# Patient Record
Sex: MALE | Race: WHITE | NOT HISPANIC OR LATINO
[De-identification: names, ages, dates, MRNs, and addresses within clinical notes are randomized per-mention and may not be internally consistent; named-entity substitution may affect disease eponyms.]

---

## 2018-12-21 ENCOUNTER — APPOINTMENT (OUTPATIENT)
Dept: DERMATOLOGY | Facility: CLINIC | Age: 58
End: 2018-12-21
Payer: MEDICARE

## 2018-12-21 PROBLEM — Z00.00 ENCOUNTER FOR PREVENTIVE HEALTH EXAMINATION: Status: ACTIVE | Noted: 2018-12-21

## 2018-12-21 PROCEDURE — 99203 OFFICE O/P NEW LOW 30 MIN: CPT

## 2022-03-19 PROBLEM — F41.9 ANXIETY: Status: ACTIVE | Noted: 2020-09-16

## 2022-03-19 PROBLEM — R49.8 HYPOPHONIA: Status: ACTIVE | Noted: 2020-09-16

## 2022-03-19 PROBLEM — G20 PARKINSON DISEASE (HCC): Status: ACTIVE | Noted: 2020-09-16

## 2022-03-19 PROBLEM — R25.1 TREMOR: Status: ACTIVE | Noted: 2020-09-16

## 2022-03-19 PROBLEM — G20.A1 PARKINSON DISEASE: Status: ACTIVE | Noted: 2020-09-16

## 2022-08-17 ENCOUNTER — OFFICE VISIT (OUTPATIENT)
Dept: NEUROLOGY | Age: 62
End: 2022-08-17
Payer: MEDICARE

## 2022-08-17 VITALS
HEART RATE: 80 BPM | SYSTOLIC BLOOD PRESSURE: 117 MMHG | WEIGHT: 195 LBS | DIASTOLIC BLOOD PRESSURE: 74 MMHG | HEIGHT: 70 IN | BODY MASS INDEX: 27.92 KG/M2

## 2022-08-17 DIAGNOSIS — F41.9 ANXIETY: ICD-10-CM

## 2022-08-17 DIAGNOSIS — R49.8 HYPOPHONIA: ICD-10-CM

## 2022-08-17 DIAGNOSIS — G20 PARKINSON DISEASE (HCC): Primary | ICD-10-CM

## 2022-08-17 DIAGNOSIS — R25.1 TREMOR: ICD-10-CM

## 2022-08-17 PROCEDURE — 3017F COLORECTAL CA SCREEN DOC REV: CPT | Performed by: PSYCHIATRY & NEUROLOGY

## 2022-08-17 PROCEDURE — G8419 CALC BMI OUT NRM PARAM NOF/U: HCPCS | Performed by: PSYCHIATRY & NEUROLOGY

## 2022-08-17 PROCEDURE — 1036F TOBACCO NON-USER: CPT | Performed by: PSYCHIATRY & NEUROLOGY

## 2022-08-17 PROCEDURE — G8427 DOCREV CUR MEDS BY ELIG CLIN: HCPCS | Performed by: PSYCHIATRY & NEUROLOGY

## 2022-08-17 PROCEDURE — 99215 OFFICE O/P EST HI 40 MIN: CPT | Performed by: PSYCHIATRY & NEUROLOGY

## 2022-08-17 ASSESSMENT — PATIENT HEALTH QUESTIONNAIRE - PHQ9
SUM OF ALL RESPONSES TO PHQ QUESTIONS 1-9: 0
1. LITTLE INTEREST OR PLEASURE IN DOING THINGS: 0
2. FEELING DOWN, DEPRESSED OR HOPELESS: 0
SUM OF ALL RESPONSES TO PHQ QUESTIONS 1-9: 0
SUM OF ALL RESPONSES TO PHQ9 QUESTIONS 1 & 2: 0
SUM OF ALL RESPONSES TO PHQ QUESTIONS 1-9: 0
SUM OF ALL RESPONSES TO PHQ QUESTIONS 1-9: 0

## 2022-08-17 ASSESSMENT — ENCOUNTER SYMPTOMS
ALLERGIC/IMMUNOLOGIC NEGATIVE: 1
EYES NEGATIVE: 1
GASTROINTESTINAL NEGATIVE: 1
RESPIRATORY NEGATIVE: 1

## 2022-08-17 NOTE — PROGRESS NOTES
08/17/22  Tasia Luis        Chief Complaint:  Chief Complaint   Patient presents with    Neurologic Problem     6 month follow up Parkinson's        Parkinson's Disease Diagnosed: 20 years ago at 43years old that began with right hand tremor and dexterity issues. Tremors began about 2 years ago. HPI:   Tasia Luis, 60 y.o.,male here in clinic follow up for continued management of Parkinson's Disease. He and his wife feel he is stable. They work hard at eating very healthy. He is sleeping well. Swallowing is not an issue. No constipation. Balance is stable at this time when his meds work, he feels he moves well. Denies night time wearing off symptoms despite last dose at 4:30pm. Sinemet kicks in at about 30-40 mins. Current Neuro related meds:  Sinemet 25/100mg 1.5 tabs at 6:30am, 2:30 pm and 1 tab at 10:30am, 4:30pm (3-3.5 hrs apart)      Review of Systems:    Review of Systems   Constitutional: Negative. HENT: Negative. Eyes: Negative. Respiratory: Negative. Cardiovascular: Negative. Gastrointestinal: Negative. Endocrine: Negative. Genitourinary: Negative. Musculoskeletal: Negative. Skin: Negative. Allergic/Immunologic: Negative. Neurological: Negative. Hematological: Negative. Psychiatric/Behavioral: Negative. Patient denies:  dizziness or light headedness,  drooling or swallowing issues  constipation,   hallucinations/ visual illusions or impulse control disorder   recent falls. RBD     RLS    Past Medical History:   Diagnosis Date    [facility]         History reviewed. No pertinent surgical history.     Family History   Problem Relation Age of Onset    No Known Problems Mother     No Known Problems Father        Social History     Socioeconomic History    Marital status:      Spouse name: Not on file    Number of children: Not on file    Years of education: Not on file    Highest education level: Not on file   Occupational History    Not on file   Tobacco Use    Smoking status: Never    Smokeless tobacco: Never   Substance and Sexual Activity    Alcohol use: Never    Drug use: Never    Sexual activity: Not on file   Other Topics Concern    Not on file   Social History Narrative    Not on file     Social Determinants of Health     Financial Resource Strain: Not on file   Food Insecurity: Not on file   Transportation Needs: Not on file   Physical Activity: Not on file   Stress: Not on file   Social Connections: Not on file   Intimate Partner Violence: Not on file   Housing Stability: Not on file       Current Outpatient Medications on File Prior to Visit   Medication Sig Dispense Refill    carbidopa-levodopa (SINEMET)  MG per tablet TAKE 1&1/2 TABLETS BY MOUTH AT 6:30AM AND  2:30PM AND TAKE 1 TABLET BY MOUTH AT 10:30 AM AND 6:30PM 450 tablet 0     No current facility-administered medications on file prior to visit. No Known Allergies        Physical Examination    Vitals:    08/17/22 1111 08/17/22 1113   BP: 133/83 117/74   Site: Left Upper Arm Left Upper Arm   Position: Sitting Standing   Pulse: 68 80   Weight: 195 lb (88.5 kg)    Height: 5' 10\" (1.778 m)          General: No acute distress  Psychiatric: well oriented, normal mood and affect  Cardiovascular: no peripheral edema, no JVD, no carotid bruits, RRR  Pulmonary: CTAB  Skin: No rashes, lesions or ulcers  Ext: no C/C/E      Neurologic Exam  Patient oriented to Person, Place and Time. Language and fund of knowledge intact. CN:  Extraocular movements are intact,facial sensation  Intact and strength is intact, , palate elevates normally, tongue protrudes midline, shoulder shrug is normal.    Motor:RUE 5/5, RLE 5/5, LUE 5/5, LLE 5/5 ,  normal bulk and tone    Reflexes: Patellar reflexes are +2 , Achilles reflexes are 1+ , toes are downgoing.     Sensation: Normal  light touch, temperature and vibration throughout     Cerebellar: FTN, HTS intact    Motor Examination: Last dose of sinemet was 2 hrs ago. But unsure of time    Voice tremor     Chin tremor      RIGHT LEFT   Tremor at rest 0 0   Postural Tremor of hands 0 0   Action Tremor of hands 0 0   Tremor of Lower extremity 0 0   Open/Close 2 3   Rapid Alternating Movements of Hands 2 2   Finger Taps 2 3   Rigidity - Upper extremity 2 2   Rigidity- Lower extremity  2 3   Foot tapping/LE agility 3 4       Hypophonia 3, freezing of speech   Hypomimia 3   Arising from Chair WNL   Posture Slighty bent forward at waist   Gait Good speed but decreased stride and freezing with turns, arms pulled back at his sides, dystonic position. Pull test Deferred    Dyskinesia  absent   Body Bradykinsesia 3     Assessment/Plan:   Diagnosis Orders   1. Parkinson disease ([facility]        2. Tremor        3. Anxiety        4. Hypophonia              Today I spent time discussing with him and his wife his concerns for increasing his sinemet dose. It has been something that has been needing to happen but he has always been reluctant and even discussing it causing him an intense amount of anxiety that it obvious. But to leave him on a current dose is not something I can feel good doing so we discussed his concerns and what side effects of the medication are true and not true. Such as sinemet causing dementia or leading to tolerance. These are incorrect notions. He has agreed to a slow titration over several weeks to 2 tabs QID. I suspect some mild CI and will monitor for this. I have spent greater than 50% of the patient's 60 minute visit  in counseling for importance of exercise,  medications and education of disease and disease progression. Patient is to continue all other medications as directed by prescribing physicians unless addressed above in plan. Continuation of these medications from today's visit are made based on the patient's report of current medications.      Yarelis Pichardo MD  [facility] Neurology  Director Movement Disorders Program  [facility]. [address]  [place], [address]  Phone: [phone]  Fax: [phone]

## 2023-02-17 ENCOUNTER — OFFICE VISIT (OUTPATIENT)
Dept: NEUROLOGY | Age: 63
End: 2023-02-17

## 2023-02-17 VITALS — SYSTOLIC BLOOD PRESSURE: 116 MMHG | HEART RATE: 68 BPM | DIASTOLIC BLOOD PRESSURE: 72 MMHG

## 2023-02-17 DIAGNOSIS — G20 PARKINSON DISEASE (HCC): Primary | ICD-10-CM

## 2023-02-17 DIAGNOSIS — F41.9 ANXIETY: ICD-10-CM

## 2023-02-17 DIAGNOSIS — R29.3 ABNORMAL POSTURE: ICD-10-CM

## 2023-02-17 DIAGNOSIS — Z79.899 ENCOUNTER FOR LONG-TERM (CURRENT) USE OF HIGH-RISK MEDICATION: ICD-10-CM

## 2023-02-17 DIAGNOSIS — R25.1 TREMOR: ICD-10-CM

## 2023-02-17 DIAGNOSIS — R49.8 HYPOPHONIA: ICD-10-CM

## 2023-02-17 ASSESSMENT — ENCOUNTER SYMPTOMS
ALLERGIC/IMMUNOLOGIC NEGATIVE: 1
GASTROINTESTINAL NEGATIVE: 1
EYES NEGATIVE: 1
RESPIRATORY NEGATIVE: 1

## 2023-02-17 NOTE — PROGRESS NOTES
03/12/23  VERNELL Childers        Chief Complaint:  Chief Complaint   Patient presents with    Follow-up     Parkinson's disease       Parkinson's Disease Diagnosed: 20 years ago at 43years old that began with right hand tremor and dexterity issues. Tremors began about 2 years ago. HPI:   Chris Gutierrez, 60 y.o.,male here in clinic follow up for continued management of Parkinson's Disease. At his last visit I increased his sinemet dose and he has done well. Wife feels that when he takes his dose it hits him like a bit of sudden kick of energy. He states that he feels more energized on this dose. He denies wearing off. He is sleeping well at night. Denies cramping at night. He takes magnesium. He gets up once to go to the bathroom but needs help getting up. No trouble swallowing, no coughing or choking with liquids or solids. Constipation is not an issue. His BP drops on standing but he is not getting lightheaded. His HR does increase on standing. He is good about staying hydrated with juices. He is not good about water. Wife notices a difference if he is dehydrated. Current Neuro related meds:  Sinemet 25/100mg 2 tabs 7am, 11am, 3pm, 7pm      Review of Systems:    Review of Systems   Constitutional: Negative. HENT: Negative. Eyes: Negative. Respiratory: Negative. Cardiovascular: Negative. Gastrointestinal: Negative. Endocrine: Negative. Genitourinary: Negative. Musculoskeletal: Negative. Allergic/Immunologic: Negative. Neurological:  Positive for tremors and speech difficulty. Hematological: Negative. Psychiatric/Behavioral: Negative. Patient denies:  dizziness or light headedness,  drooling or swallowing issues  constipation,   hallucinations/ visual illusions or impulse control disorder   recent falls. RBD     RLS    Past Medical History:   Diagnosis Date    [facility]         History reviewed.  No pertinent surgical history. Family History   Problem Relation Age of Onset    No Known Problems Mother     No Known Problems Father        Social History     Socioeconomic History    Marital status:      Spouse name: Not on file    Number of children: Not on file    Years of education: Not on file    Highest education level: Not on file   Occupational History    Not on file   Tobacco Use    Smoking status: Never    Smokeless tobacco: Never   Substance and Sexual Activity    Alcohol use: Never    Drug use: Never    Sexual activity: Not on file   Other Topics Concern    Not on file   Social History Narrative    Not on file     Social Determinants of Health     Financial Resource Strain: Not on file   Food Insecurity: Not on file   Transportation Needs: Not on file   Physical Activity: Not on file   Stress: Not on file   Social Connections: Not on file   Intimate Partner Violence: Not on file   Housing Stability: Not on file       Current Outpatient Medications on File Prior to Visit   Medication Sig Dispense Refill    carbidopa-levodopa (SINEMET)  MG per tablet 2 tabs at 630am, 10:30am, 2:30pm,6:30pm 720 tablet 3     No current facility-administered medications on file prior to visit. No Known Allergies        Physical Examination    Vitals:    02/17/23 1118 02/17/23 1119   BP: 132/78 116/72   Position: Sitting Standing   Pulse: 58 68         General: No acute distress  Psychiatric: well oriented, normal mood and affect  Cardiovascular: no peripheral edema, no JVD, no carotid bruits, RRR  Pulmonary: CTAB  Skin: No rashes, lesions or ulcers  Ext: no C/C. Edema 2++ BLE      Neurologic Exam  Patient oriented to Person, Place and Time. Language and fund of knowledge intact.    CN:  Extraocular movements are intact,facial sensation  Intact and strength is intact, , palate elevates normally, tongue protrudes midline, shoulder shrug is normal.    Motor:RUE 5/5, RLE 5/5, LUE 5/5, LLE 5/5 ,  normal bulk and tone    Reflexes: Patellar reflexes are +2 , Achilles reflexes are 1+ , toes are downgoing. Sensation: Normal  light touch, temperature and vibration throughout     Cerebellar: FTN, HTS intact    Motor Examination: Last dose of sinemet was 50 mins ago. Voice tremor       Chin tremor         RIGHT LEFT   Tremor at rest 0 0   Postural Tremor of hands 0 0   Action Tremor of hands 0 0   Tremor of Lower extremity 0 0   Open/Close 1 1   Rapid Alternating Movements of Hands 2 2   Finger Taps 2 2   Rigidity - Upper extremity 2 2   Rigidity- Lower extremity  3 3   Foot tapping/LE agility 2 4         Hypophonia 3, freezing of speech   Hypomimia 3, mouth open drooling   Arising from Chair Slower but no arm rests   Posture Slighty bent forward at waist   Gait Good speed but decreased stride and freezing with turns, arms pulled back at his sides, dystonic position. Pull test Deferred    Dyskinesia  absent   Body Bradykinsesia 3        Assessment/Plan:   Diagnosis Orders   1. Parkinson disease ([facility]        2. Tremor        3. Hypophonia        4. Anxiety        5. Abnormal posture        6. Encounter for long-term (current) use of high-risk medication            Tremor predominant PD H&Y stage 3 that is still very rigid and slow. But his voice is softer and he is slower to respond. Today we discussed that I continue to feel he would do better on higher doses of sinemet but I know he is reluctant to increase doses. I am happy that he has slowly increased like he has to his current dose. I also feel it is time for him to really start working with speech. He does it online and is not interested in coming in to work with someone. But today I explained the concern that this is not about just his volume and communication, which he will lose at this rate of decline. But that it is also increasing his risk for aspiration. And he understood. This is something that we can avoid by working with speech therapy.  He will consider it and let me know. I am concerned about silent aspiration but he is not ready to address it today. No changes to his current regimen. I have spent greater than 50% of the patient's 60 minute visit  in counseling for importance of exercise,  medications and education of disease and disease progression. Patient is to continue all other medications as directed by prescribing physicians unless addressed above in plan. Continuation of these medications from today's visit are made based on the patient's report of current medications.      Evon Sethi MD  [facility] Neurology  Director Movement Disorders Program  [address] [place] [address] [address], [address] [facility]  Phone: [phone]  Fax: [phone]

## 2023-10-16 NOTE — PROGRESS NOTES
10/19/23  Griselda Ke Deperino        Chief Complaint:  Chief Complaint   Patient presents with    Follow-up       Parkinson's disease         Parkinson's Disease Diagnosed:  20 years ago at 43years old that began with right hand tremor and dexterity issues. Tremors began about 2 years ago. HPI:   Radha Marquez, 58 y.o.,male here in clinic follow up for continued management of Parkinson's Disease. He has been doing well. Sinemet is working well and is not wearing off btw doses. He has been having trouble with freezing in tight areas and narrow areas. He is sleeping well. Swallowing is good. Can swallow easier if liquids are thicker. But not choking. He is drooling. Current Neuro related meds:  Sinemet 25/100mg 2 tabs 7am, 11am, 3pm, 7pm          Review of Systems   Constitutional:  Negative for appetite change. HENT:  Negative for hearing loss and tinnitus. Eyes:  Negative for visual disturbance. Gastrointestinal:  Negative for constipation. Musculoskeletal:  Negative for back pain and neck pain. Neurological:  Negative for dizziness, tremors, seizures, speech difficulty, numbness and headaches. Psychiatric/Behavioral:  Negative for hallucinations and sleep disturbance. The patient is not nervous/anxious. Past Medical History:   Diagnosis Date    Parkinson's disease         History reviewed. No pertinent surgical history.     Family History   Problem Relation Age of Onset    No Known Problems Mother     No Known Problems Father        Social History     Socioeconomic History    Marital status:      Spouse name: Not on file    Number of children: Not on file    Years of education: Not on file    Highest education level: Not on file   Occupational History    Not on file   Tobacco Use    Smoking status: Never    Smokeless tobacco: Never   Substance and Sexual Activity    Alcohol use: Never    Drug use: Never    Sexual activity: Not on file   Other Topics Concern    Not

## 2023-10-19 ENCOUNTER — OFFICE VISIT (OUTPATIENT)
Dept: NEUROLOGY | Age: 63
End: 2023-10-19
Payer: MEDICARE

## 2023-10-19 VITALS
OXYGEN SATURATION: 94 % | DIASTOLIC BLOOD PRESSURE: 72 MMHG | WEIGHT: 186 LBS | HEART RATE: 71 BPM | SYSTOLIC BLOOD PRESSURE: 118 MMHG | BODY MASS INDEX: 26.69 KG/M2

## 2023-10-19 DIAGNOSIS — R29.3 POSTURAL IMBALANCE: ICD-10-CM

## 2023-10-19 DIAGNOSIS — G20.A1 PARKINSON'S DISEASE WITHOUT DYSKINESIA OR FLUCTUATING MANIFESTATIONS: Primary | ICD-10-CM

## 2023-10-19 DIAGNOSIS — R13.19 DYSPHAGIA, NEUROLOGIC: ICD-10-CM

## 2023-10-19 DIAGNOSIS — R49.8 HYPOPHONIA: ICD-10-CM

## 2023-10-19 DIAGNOSIS — R29.3 ABNORMAL POSTURE: ICD-10-CM

## 2023-10-19 DIAGNOSIS — R25.1 TREMOR: ICD-10-CM

## 2023-10-19 PROCEDURE — 99215 OFFICE O/P EST HI 40 MIN: CPT | Performed by: PSYCHIATRY & NEUROLOGY

## 2023-10-19 PROCEDURE — G8484 FLU IMMUNIZE NO ADMIN: HCPCS | Performed by: PSYCHIATRY & NEUROLOGY

## 2023-10-19 PROCEDURE — 1036F TOBACCO NON-USER: CPT | Performed by: PSYCHIATRY & NEUROLOGY

## 2023-10-19 PROCEDURE — G8419 CALC BMI OUT NRM PARAM NOF/U: HCPCS | Performed by: PSYCHIATRY & NEUROLOGY

## 2023-10-19 PROCEDURE — G8427 DOCREV CUR MEDS BY ELIG CLIN: HCPCS | Performed by: PSYCHIATRY & NEUROLOGY

## 2023-10-19 PROCEDURE — 3017F COLORECTAL CA SCREEN DOC REV: CPT | Performed by: PSYCHIATRY & NEUROLOGY

## 2023-10-19 ASSESSMENT — ENCOUNTER SYMPTOMS
CONSTIPATION: 0
BACK PAIN: 0

## 2024-04-18 ENCOUNTER — OFFICE VISIT (OUTPATIENT)
Dept: NEUROLOGY | Age: 64
End: 2024-04-18
Payer: MEDICARE

## 2024-04-18 VITALS
DIASTOLIC BLOOD PRESSURE: 90 MMHG | SYSTOLIC BLOOD PRESSURE: 135 MMHG | HEART RATE: 77 BPM | BODY MASS INDEX: 27.84 KG/M2 | OXYGEN SATURATION: 96 % | WEIGHT: 194 LBS

## 2024-04-18 DIAGNOSIS — R49.8 HYPOPHONIA: ICD-10-CM

## 2024-04-18 DIAGNOSIS — R26.89 FESTINATING GAIT: ICD-10-CM

## 2024-04-18 DIAGNOSIS — K11.7 SIALORRHEA: ICD-10-CM

## 2024-04-18 DIAGNOSIS — G20.A1 PARKINSON'S DISEASE WITHOUT DYSKINESIA OR FLUCTUATING MANIFESTATIONS (HCC): Primary | ICD-10-CM

## 2024-04-18 DIAGNOSIS — R25.1 TREMOR: ICD-10-CM

## 2024-04-18 PROCEDURE — 99215 OFFICE O/P EST HI 40 MIN: CPT | Performed by: PSYCHIATRY & NEUROLOGY

## 2024-04-18 PROCEDURE — 1036F TOBACCO NON-USER: CPT | Performed by: PSYCHIATRY & NEUROLOGY

## 2024-04-18 PROCEDURE — G8427 DOCREV CUR MEDS BY ELIG CLIN: HCPCS | Performed by: PSYCHIATRY & NEUROLOGY

## 2024-04-18 PROCEDURE — 3017F COLORECTAL CA SCREEN DOC REV: CPT | Performed by: PSYCHIATRY & NEUROLOGY

## 2024-04-18 PROCEDURE — G2211 COMPLEX E/M VISIT ADD ON: HCPCS | Performed by: PSYCHIATRY & NEUROLOGY

## 2024-04-18 PROCEDURE — G8419 CALC BMI OUT NRM PARAM NOF/U: HCPCS | Performed by: PSYCHIATRY & NEUROLOGY

## 2024-04-18 RX ORDER — VITAMIN B COMPLEX
1 CAPSULE ORAL DAILY
COMMUNITY

## 2024-04-18 RX ORDER — ATROPINE SULFATE 10 MG/ML
SOLUTION/ DROPS OPHTHALMIC
Qty: 10 ML | Refills: 4 | Status: SHIPPED | OUTPATIENT
Start: 2024-04-18

## 2024-04-18 ASSESSMENT — ENCOUNTER SYMPTOMS
CONSTIPATION: 0
BACK PAIN: 0

## 2024-04-18 NOTE — PROGRESS NOTES
04/18/24  Mathieu Childers        Chief Complaint:  Chief Complaint   Patient presents with    Follow-up       Parkinson's disease       Parkinson's Disease Diagnosed: 20 years ago at 38 years old that began with right hand tremor and dexterity issues. Tremors began about 2 years ago       HPI:   Mathieu Childers, 63 y.o.,male here in clinic follow up for continued management of Parkinson's Disease. He feels he is doing well. No changes. His sinemet is working well. He is doing online speech exercises and is using a Rebreather. He is not choking or coughing. He is sleeping well. Has not had a decline in PT. He is working out in the gym 4 times a week.     Current Neuro related meds:  Sinemet 25/100mg 2 tabs 7am, 11am, 3pm, 7pm           Review of Systems   Constitutional:  Negative for appetite change.   HENT:  Negative for hearing loss and tinnitus.    Eyes:  Negative for visual disturbance.   Gastrointestinal:  Negative for constipation.   Musculoskeletal:  Negative for back pain and neck pain.   Neurological:  Positive for speech difficulty. Negative for dizziness, tremors, seizures, numbness and headaches.   Psychiatric/Behavioral:  Negative for hallucinations and sleep disturbance. The patient is not nervous/anxious.           Past Medical History:   Diagnosis Date    Parkinson's disease (HCC)         History reviewed. No pertinent surgical history.    Family History   Problem Relation Age of Onset    No Known Problems Mother     No Known Problems Father        Social History     Socioeconomic History    Marital status:      Spouse name: Not on file    Number of children: Not on file    Years of education: Not on file    Highest education level: Not on file   Occupational History    Not on file   Tobacco Use    Smoking status: Never    Smokeless tobacco: Never   Substance and Sexual Activity    Alcohol use: Never    Drug use: Never    Sexual activity: Not on file   Other Topics Concern    Not on

## 2024-04-22 ENCOUNTER — TELEPHONE (OUTPATIENT)
Dept: NEUROLOGY | Age: 64
End: 2024-04-22

## 2024-04-22 NOTE — TELEPHONE ENCOUNTER
Patient called and said the atropine 1 % ophthalmic solution  ordered on 4/18 are over $100 and to see if something can be done to get them at a cheaper price or if there is another medication that they can try. [employer] this and this came up on [employer] will attach a screenshot below. Not sure if this is the same thing if it is I will call patient and let them know and then can re-pend medication to their preference based on this.

## 2024-04-23 RX ORDER — ATROPINE SULFATE 10 MG/ML
SOLUTION/ DROPS OPHTHALMIC
Qty: 5 ML | Refills: 4 | Status: SHIPPED | OUTPATIENT
Start: 2024-04-23

## 2024-04-23 NOTE — TELEPHONE ENCOUNTER
Called patients wife to let her know this and emailed her the goodrx coupon. Repended medication to [facility] so patient can use GoodRX coupon since it is cheaper there than at [facility]. Also changed mL amount from 10mL to 5mL so that coupon could be applied and hopefully [facility] wont give them any problems. If that is incorrect I can change that back thank you!!

## 2024-10-21 NOTE — PROGRESS NOTES
10/24/24  Mathieu Childers        Chief Complaint:  Chief Complaint   Patient presents with    Follow-up       Parkinson's disease         Parkinson's Disease Diagnosed: 20 years ago at 38 years old that began with right hand tremor and dexterity issues. Tremors began about 2 years ago       HPI:   Mathieu Childers, 64 y.o.,male here in clinic follow up for continued management of Parkinson's Disease. He feels his sinemet is lasting about 3 hrs apart. His balance is stable if he is taking his sinemet. Swallowing is good. Voice seems unchanged.Sleeping well with melatonin if needed and magnesium helps. He does take a probiotic and this helps his constipation. He is doing well with hydration lately. This is also helping.     Current Neuro related meds:  Sinemet 25/100mg 2 tabs 7am, 11am, 3pm, 7pm           Review of Systems:  Review of Systems   Constitutional:  Negative for appetite change.   HENT:  Negative for hearing loss and tinnitus.    Eyes:  Negative for visual disturbance.   Gastrointestinal:  Negative for constipation.   Musculoskeletal:  Negative for back pain and neck pain.   Neurological:  Positive for speech difficulty. Negative for dizziness, tremors, seizures, numbness and headaches.   Psychiatric/Behavioral:  Negative for hallucinations and sleep disturbance. The patient is not nervous/anxious.          History reviewed. No pertinent surgical history.    Family History   Problem Relation Age of Onset    No Known Problems Mother     No Known Problems Father        Social History     Socioeconomic History    Marital status:      Spouse name: Not on file    Number of children: Not on file    Years of education: Not on file    Highest education level: Not on file   Occupational History    Not on file   Tobacco Use    Smoking status: Never    Smokeless tobacco: Never   Substance and Sexual Activity    Alcohol use: Never    Drug use: Never    Sexual activity: Not on file   Other Topics Concern

## 2024-10-24 ENCOUNTER — OFFICE VISIT (OUTPATIENT)
Dept: NEUROLOGY | Age: 64
End: 2024-10-24
Payer: MEDICARE

## 2024-10-24 VITALS
HEART RATE: 68 BPM | DIASTOLIC BLOOD PRESSURE: 70 MMHG | OXYGEN SATURATION: 95 % | WEIGHT: 183 LBS | BODY MASS INDEX: 26.26 KG/M2 | SYSTOLIC BLOOD PRESSURE: 105 MMHG

## 2024-10-24 DIAGNOSIS — R49.8 HYPOPHONIA: ICD-10-CM

## 2024-10-24 DIAGNOSIS — G20.A1 PARKINSON'S DISEASE WITHOUT DYSKINESIA OR FLUCTUATING MANIFESTATIONS (HCC): Primary | ICD-10-CM

## 2024-10-24 DIAGNOSIS — R29.3 POSTURAL IMBALANCE: ICD-10-CM

## 2024-10-24 DIAGNOSIS — G20.A1 MILD DEMENTIA DUE TO PARKINSON'S DISEASE, WITHOUT BEHAVIORAL DISTURBANCE, PSYCHOTIC DISTURBANCE, MOOD DISTURBANCE, OR ANXIETY (HCC): ICD-10-CM

## 2024-10-24 DIAGNOSIS — G47.52 RBD (REM BEHAVIORAL DISORDER): ICD-10-CM

## 2024-10-24 DIAGNOSIS — F02.A0 MILD DEMENTIA DUE TO PARKINSON'S DISEASE, WITHOUT BEHAVIORAL DISTURBANCE, PSYCHOTIC DISTURBANCE, MOOD DISTURBANCE, OR ANXIETY (HCC): ICD-10-CM

## 2024-10-24 DIAGNOSIS — R25.1 TREMOR: ICD-10-CM

## 2024-10-24 DIAGNOSIS — Z79.899 ENCOUNTER FOR LONG-TERM (CURRENT) USE OF HIGH-RISK MEDICATION: ICD-10-CM

## 2024-10-24 PROCEDURE — G2211 COMPLEX E/M VISIT ADD ON: HCPCS | Performed by: PSYCHIATRY & NEUROLOGY

## 2024-10-24 PROCEDURE — G8419 CALC BMI OUT NRM PARAM NOF/U: HCPCS | Performed by: PSYCHIATRY & NEUROLOGY

## 2024-10-24 PROCEDURE — 99215 OFFICE O/P EST HI 40 MIN: CPT | Performed by: PSYCHIATRY & NEUROLOGY

## 2024-10-24 PROCEDURE — 3017F COLORECTAL CA SCREEN DOC REV: CPT | Performed by: PSYCHIATRY & NEUROLOGY

## 2024-10-24 PROCEDURE — 1036F TOBACCO NON-USER: CPT | Performed by: PSYCHIATRY & NEUROLOGY

## 2024-10-24 PROCEDURE — G8484 FLU IMMUNIZE NO ADMIN: HCPCS | Performed by: PSYCHIATRY & NEUROLOGY

## 2024-10-24 PROCEDURE — 1123F ACP DISCUSS/DSCN MKR DOCD: CPT | Performed by: PSYCHIATRY & NEUROLOGY

## 2024-10-24 PROCEDURE — G8427 DOCREV CUR MEDS BY ELIG CLIN: HCPCS | Performed by: PSYCHIATRY & NEUROLOGY

## 2024-10-24 RX ORDER — CARBIDOPA AND LEVODOPA 25; 100 MG/1; MG/1
TABLET ORAL
Qty: 720 TABLET | Refills: 3 | Status: SHIPPED | OUTPATIENT
Start: 2024-10-24

## 2024-10-24 ASSESSMENT — ENCOUNTER SYMPTOMS
BACK PAIN: 0
CONSTIPATION: 0

## 2025-07-01 ENCOUNTER — OFFICE VISIT (OUTPATIENT)
Dept: NEUROLOGY | Age: 65
End: 2025-07-01
Payer: MEDICARE

## 2025-07-01 VITALS
SYSTOLIC BLOOD PRESSURE: 157 MMHG | BODY MASS INDEX: 26.26 KG/M2 | HEIGHT: 70 IN | HEART RATE: 68 BPM | DIASTOLIC BLOOD PRESSURE: 77 MMHG

## 2025-07-01 DIAGNOSIS — R49.8 HYPOPHONIA: ICD-10-CM

## 2025-07-01 DIAGNOSIS — G20.A1 PARKINSON'S DISEASE WITHOUT DYSKINESIA OR FLUCTUATING MANIFESTATIONS (HCC): Primary | ICD-10-CM

## 2025-07-01 DIAGNOSIS — R26.9 GAIT ABNORMALITY: ICD-10-CM

## 2025-07-01 PROCEDURE — 3017F COLORECTAL CA SCREEN DOC REV: CPT | Performed by: PSYCHIATRY & NEUROLOGY

## 2025-07-01 PROCEDURE — G8419 CALC BMI OUT NRM PARAM NOF/U: HCPCS | Performed by: PSYCHIATRY & NEUROLOGY

## 2025-07-01 PROCEDURE — 1036F TOBACCO NON-USER: CPT | Performed by: PSYCHIATRY & NEUROLOGY

## 2025-07-01 PROCEDURE — G8427 DOCREV CUR MEDS BY ELIG CLIN: HCPCS | Performed by: PSYCHIATRY & NEUROLOGY

## 2025-07-01 PROCEDURE — 1123F ACP DISCUSS/DSCN MKR DOCD: CPT | Performed by: PSYCHIATRY & NEUROLOGY

## 2025-07-01 PROCEDURE — 99214 OFFICE O/P EST MOD 30 MIN: CPT | Performed by: PSYCHIATRY & NEUROLOGY

## 2025-07-01 RX ORDER — CARBIDOPA AND LEVODOPA 25; 100 MG/1; MG/1
TABLET ORAL
Qty: 720 TABLET | Refills: 3 | Status: SHIPPED | OUTPATIENT
Start: 2025-07-01

## 2025-07-01 ASSESSMENT — ENCOUNTER SYMPTOMS
COUGH: 0
CONSTIPATION: 1
VOICE CHANGE: 1

## 2025-07-01 NOTE — PROGRESS NOTES
[facility] NEUROLOGY  [address], [address]  [place] [address]  Phone: [phone] Fax [phone]  Aleks Castillo MD      Patient: Mathieu Childers  Provider: Aleks Castillo MD    CC:   Chief Complaint   Patient presents with    Follow-up     Parkinson's disease, frequent falls      Referring Provider:    History of Present Illness:     Mathieu Childers is a 64 y.o. RH male who presents for follow-up of Parkinson's disease.     He is accompanied by his spouse.      Patient presents for follow-up and continued management of Parkinson's disease, diagnosed at the age of 38 years with symptoms at onset including a right sided tremor.    He is a previous patient of Dr. CORONEL, last seen October 2024 and these records have been reviewed.    Current medications include (but not limited to):  Sinemet  mg 2 tablets 4 times a day (7 AM, 11 AM, 3 PM, 7 PM)    Previous medication trials include: N/A    Patient presents today for follow-up.    He is seen today in a wheelchair due to the relatively long distance required to travel to the office.  But at home he walks unassisted.  However spouse notes a change in his gait with increasing festination, forward leaning posture, and toe walking.  This has unfortunately led to more frequent falls.  Tremors have not been an issue.    He remains on Sinemet as noted above, denying any obvious motor fluctuations, the previous notes have indicated that he can have some perception of wearing off between doses.  No dyskinesias have been noted.  Last notes indicate suggesting Ongentys, but it does not appear he was ever tried.    He is relatively quiet at today's evaluation, though he is very attentive and cooperative.  Speech volume is decreased.  No swallowing disturbances are noted.  No sialorrhea.    He appears to be sleeping well at night, no RBD.  Has taken melatonin as needed.  No neuropsychiatric disturbances.  I suspect at least some degree of cognitive dysfunction but does

## 2025-07-14 ENCOUNTER — HOSPITAL ENCOUNTER (OUTPATIENT)
Dept: PHYSICAL THERAPY | Age: 65
Setting detail: RECURRING SERIES
Discharge: HOME | End: 2025-07-17
Attending: PSYCHIATRY & NEUROLOGY
Payer: MEDICARE

## 2025-07-14 DIAGNOSIS — R26.2 DIFFICULTY IN WALKING, NOT ELSEWHERE CLASSIFIED: Primary | ICD-10-CM

## 2025-07-14 DIAGNOSIS — R29.3 ABNORMAL POSTURE: ICD-10-CM

## 2025-07-14 DIAGNOSIS — R26.89 IMBALANCE: ICD-10-CM

## 2025-07-14 PROCEDURE — 97163 PT EVAL HIGH COMPLEX 45 MIN: CPT

## 2025-07-14 PROCEDURE — 97110 THERAPEUTIC EXERCISES: CPT

## 2025-07-14 ASSESSMENT — PAIN SCALES - GENERAL: PAINLEVEL_OUTOF10: 0

## 2025-07-14 NOTE — PROGRESS NOTES
Mathieu Childers  : 1960  Primary: [employer] (Medicare Managed)  Secondary:  [facility] @ [address]  [place] [address]  Phone: [phone]  Fax: [phone] Plan Frequency: 1-2 times a week for 90 days    Plan of Care/Certification Expiration Date: 10/12/25        Plan of Care/Certification Expiration Date:  Plan of Care/Certification Expiration Date: 10/12/25    Frequency/Duration: Plan Frequency: 1-2 times a week for 90 days      Time In/Out:   Time In: 30  Time Out: 0815      PT Visit Info:    Total # of Visits Approved: 1 (More visits pending insurance authorization)  Progress Note Due Date: 25  Total # of Visits to Date: 1  Progress Note Counter: 1      Visit Count:  1    OUTPATIENT PHYSICAL THERAPY:   Treatment Note 2025       Episode  (PT-Parkinson's disease)               Treatment Diagnosis:    Difficulty in walking, not elsewhere classified  Imbalance  Abnormal posture  Medical/Referring Diagnosis:    Parkinson's disease without dyskinesia or fluctuating manifestations (HCC)  Gait abnormality  Referring Physician:  Aleks Castillo MD MD Orders:  PT Eval and Treat   Return MD Appt:  Unknown   Date of Onset:  Onset Date:  (Chronic- diagnosed 39 years ago)   Allergies:   Patient has no known allergies.  Restrictions/Precautions:   None      Interventions Planned (Treatment may consist of any combination of the following):     See Assessment Note    Subjective Comments:   Patient complains of imbalance, impaired posture, and difficulty with walking  Initial Pain Level:     0/10  Post Session Pain Level:      0/10  Medications Last Reviewed: 2025  Updated Objective Findings:  See Evaluation Note from today  Treatment   THERAPEUTIC EXERCISE: (15 minutes):    Exercises per grid below to improve mobility and strength.  Required minimal verbal cues to promote proper body alignment.  Progressed repetitions as indicated.

## 2025-07-14 NOTE — THERAPY EVALUATION
Mathieu Childers  : 1960  Primary: [employer] (Medicare Managed)  Secondary:  [facility] @ [address] [place]  [facility] [address]  Phone: [phone]  Fax: [phone] Plan Frequency: 1-2 times a week for 90 days  Plan of Care/Certification Expiration Date: 10/12/25        Plan of Care/Certification Expiration Date:  Plan of Care/Certification Expiration Date: 10/12/25    Frequency/Duration: Plan Frequency: 1-2 times a week for 90 days      Time In/Out:   Time In: 0730  Time Out: 0815      PT Visit Info:    Total # of Visits Approved: 1 (More visits pending insurance authorization)  Progress Note Due Date: 25  Total # of Visits to Date: 1  Progress Note Counter: 1      Visit Count:  1                OUTPATIENT PHYSICAL THERAPY:             Initial Assessment 2025               Episode (PT-Parkinson's disease)         Treatment Diagnosis:     Difficulty in walking, not elsewhere classified  Imbalance  Abnormal posture  Medical/Referring Diagnosis:    Parkinson's disease without dyskinesia or fluctuating manifestations (HCC)  Gait abnormality  Referring Physician:  Aleks Castillo MD MD Orders:  PT Eval and Treat   Return MD Appt:  Unknown   Date of Onset:  Onset Date:  (Chronic- diagnosed 39 years ago)   Allergies:  Patient has no known allergies.  Restrictions/Precautions:    None      Medications Last Reviewed: 2025     SUBJECTIVE   History of Injury/Illness (Reason for Referral):  Patient's wife reports patient has has two falls over the past six months.  The most recent happened around three weeks ago.   Patient was walking in the park and fell forward.   Patient tends to lose his balance forward.   Patient uses no assistive device for ambulation.   Patient uses a wheelchair for community distances.  Patient rates dizziness as 0/10 and pain level as 0/10.  Patient's wife reports patient is having harder time walking-he now

## 2025-07-29 ENCOUNTER — HOSPITAL ENCOUNTER (OUTPATIENT)
Dept: PHYSICAL THERAPY | Age: 65
Setting detail: RECURRING SERIES
Discharge: HOME | End: 2025-08-01
Attending: PSYCHIATRY & NEUROLOGY
Payer: MEDICARE

## 2025-07-29 PROCEDURE — 97110 THERAPEUTIC EXERCISES: CPT

## 2025-07-29 ASSESSMENT — PAIN SCALES - GENERAL: PAINLEVEL_OUTOF10: 0

## 2025-07-29 NOTE — PROGRESS NOTES
Mathieu Childers  : 1960  Primary: [employer] (Medicare Managed)  Secondary:  [facility] @ [address] [place]  [place] [address]  Phone: [phone]  Fax: [phone] Plan Frequency: 1-2 times a week for 90 days    Plan of Care/Certification Expiration Date: 10/12/25        Plan of Care/Certification Expiration Date:  Plan of Care/Certification Expiration Date: 10/12/25    Frequency/Duration: Plan Frequency: 1-2 times a week for 90 days      Time In/Out:   Time In: 0815  Time Out: 0900      PT Visit Info:    Total # of Visits Approved: 24 (Through 10/12/2025)  Progress Note Due Date: 25  Total # of Visits to Date: 2  Progress Note Counter: 2      Visit Count:  2    OUTPATIENT PHYSICAL THERAPY:   Treatment Note 2025       Episode  (PT-Parkinson's disease)               Treatment Diagnosis:    Difficulty in walking, not elsewhere classified  Imbalance  Abnormal posture  Medical/Referring Diagnosis:    Parkinson's disease without dyskinesia or fluctuating manifestations (HCC)  Gait abnormality  Referring Physician:  Aleks Castillo MD MD Orders:  PT Eval and Treat   Return MD Appt:  Unknown   Date of Onset:  Onset Date:  (Chronic- diagnosed 39 years ago)   Allergies:   Patient has no known allergies.  Restrictions/Precautions:   None      Interventions Planned (Treatment may consist of any combination of the following):     See Assessment Note    Subjective Comments:   Patient has no complaints.   Initial Pain Level:     0/10  Post Session Pain Level:      0/10  Medications Last Reviewed: 2025  Updated Objective Findings:  None Today  Treatment   THERAPEUTIC EXERCISE: (45 minutes):    Exercises per grid below to improve mobility and strength.  Required minimal verbal cues to promote proper body alignment.  Progressed repetitions as indicated.   Date:  2025 Date:  2025 Date:     Activity/Exercise Parameters Parameters Parameters

## 2025-08-05 ENCOUNTER — HOSPITAL ENCOUNTER (OUTPATIENT)
Dept: PHYSICAL THERAPY | Age: 65
Setting detail: RECURRING SERIES
Discharge: HOME | End: 2025-08-08
Attending: PSYCHIATRY & NEUROLOGY
Payer: MEDICARE

## 2025-08-05 PROCEDURE — 97110 THERAPEUTIC EXERCISES: CPT

## 2025-08-05 ASSESSMENT — PAIN SCALES - GENERAL: PAINLEVEL_OUTOF10: 0

## 2025-08-12 ENCOUNTER — HOSPITAL ENCOUNTER (OUTPATIENT)
Dept: PHYSICAL THERAPY | Age: 65
Setting detail: RECURRING SERIES
Discharge: HOME | End: 2025-08-15
Attending: PSYCHIATRY & NEUROLOGY
Payer: MEDICARE

## 2025-08-12 PROCEDURE — 97110 THERAPEUTIC EXERCISES: CPT

## 2025-08-12 ASSESSMENT — PAIN SCALES - GENERAL: PAINLEVEL_OUTOF10: 0

## 2025-08-19 ENCOUNTER — HOSPITAL ENCOUNTER (OUTPATIENT)
Dept: PHYSICAL THERAPY | Age: 65
Setting detail: RECURRING SERIES
Discharge: HOME | End: 2025-08-22
Attending: PSYCHIATRY & NEUROLOGY
Payer: MEDICARE

## 2025-08-19 PROCEDURE — 97110 THERAPEUTIC EXERCISES: CPT

## 2025-08-19 ASSESSMENT — PAIN SCALES - GENERAL: PAINLEVEL_OUTOF10: 0

## 2025-08-26 ENCOUNTER — HOSPITAL ENCOUNTER (OUTPATIENT)
Dept: PHYSICAL THERAPY | Age: 65
Setting detail: RECURRING SERIES
Discharge: HOME | End: 2025-08-29
Attending: PSYCHIATRY & NEUROLOGY
Payer: MEDICARE

## 2025-08-26 PROCEDURE — 97110 THERAPEUTIC EXERCISES: CPT

## 2025-08-26 ASSESSMENT — PAIN SCALES - GENERAL: PAINLEVEL_OUTOF10: 0

## 2025-09-02 ENCOUNTER — HOSPITAL ENCOUNTER (OUTPATIENT)
Dept: PHYSICAL THERAPY | Age: 65
Setting detail: RECURRING SERIES
Discharge: HOME | End: 2025-09-05
Attending: PSYCHIATRY & NEUROLOGY
Payer: MEDICARE

## 2025-09-02 PROCEDURE — 97110 THERAPEUTIC EXERCISES: CPT

## 2025-09-02 ASSESSMENT — PAIN SCALES - GENERAL: PAINLEVEL_OUTOF10: 0
